# Patient Record
Sex: MALE | Race: WHITE | NOT HISPANIC OR LATINO | Employment: FULL TIME | URBAN - NONMETROPOLITAN AREA
[De-identification: names, ages, dates, MRNs, and addresses within clinical notes are randomized per-mention and may not be internally consistent; named-entity substitution may affect disease eponyms.]

---

## 2018-08-28 ENCOUNTER — NON-PROVIDER VISIT (OUTPATIENT)
Dept: MEDICAL GROUP | Facility: CLINIC | Age: 33
End: 2018-08-28

## 2018-08-28 ENCOUNTER — TELEMEDICINE2 (OUTPATIENT)
Dept: OCCUPATIONAL MEDICINE | Facility: CLINIC | Age: 33
End: 2018-08-28

## 2018-08-28 VITALS
HEART RATE: 79 BPM | OXYGEN SATURATION: 98 % | WEIGHT: 285 LBS | DIASTOLIC BLOOD PRESSURE: 82 MMHG | TEMPERATURE: 98 F | HEIGHT: 75 IN | BODY MASS INDEX: 35.43 KG/M2 | SYSTOLIC BLOOD PRESSURE: 125 MMHG

## 2018-08-28 DIAGNOSIS — Z02.4 ENCOUNTER FOR COMMERCIAL DRIVER MEDICAL EXAMINATION (CDME): ICD-10-CM

## 2018-08-28 PROCEDURE — 92553 AUDIOMETRY AIR & BONE: CPT | Performed by: PREVENTIVE MEDICINE

## 2018-08-28 PROCEDURE — 7100 PR DOT PHYSICAL: Performed by: PREVENTIVE MEDICINE

## 2018-08-28 ASSESSMENT — VISUAL ACUITY
OD_CC: 20/15
OS_CC: 20/13

## 2018-08-28 NOTE — NON-PROVIDER
Rhett Maher is a 33 y.o. 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 Yes  Routed to PCP? Yes

## 2019-02-06 ENCOUNTER — NON-PROVIDER VISIT (OUTPATIENT)
Dept: MEDICAL GROUP | Facility: CLINIC | Age: 34
End: 2019-02-06

## 2019-02-06 DIAGNOSIS — Z02.83 ENCOUNTER FOR DRUG SCREENING: ICD-10-CM

## 2019-02-06 LAB
BREATH ALCOHOL COMMENT: NORMAL
POC BREATHALIZER: 0 PERCENT (ref 0–0.01)

## 2019-02-06 PROCEDURE — 82075 ASSAY OF BREATH ETHANOL: CPT | Performed by: FAMILY MEDICINE

## 2019-02-06 PROCEDURE — 7503 PR ESCREEN ACCT UDS COL ONLY: Performed by: FAMILY MEDICINE

## 2019-02-06 NOTE — NON-PROVIDER
Rhett Maher is a 34 y.o. male here for a non-provider visit for Random UDS/[employer].    If abnormal was an in office provider notified today (if so, indicate provider)? Yes  Routed to PCP? Yes